# Patient Record
Sex: FEMALE | NOT HISPANIC OR LATINO
[De-identification: names, ages, dates, MRNs, and addresses within clinical notes are randomized per-mention and may not be internally consistent; named-entity substitution may affect disease eponyms.]

---

## 2020-09-12 ENCOUNTER — RX ONLY (OUTPATIENT)
Age: 50
Setting detail: RX ONLY
End: 2020-09-12

## 2020-09-12 ENCOUNTER — APPOINTMENT (RX ONLY)
Dept: URBAN - METROPOLITAN AREA CLINIC 368 | Facility: CLINIC | Age: 50
Setting detail: DERMATOLOGY
End: 2020-09-12

## 2020-09-12 DIAGNOSIS — L30.9 DERMATITIS, UNSPECIFIED: ICD-10-CM

## 2020-09-12 PROCEDURE — ? TREATMENT REGIMEN

## 2020-09-12 PROCEDURE — 99202 OFFICE O/P NEW SF 15 MIN: CPT

## 2020-09-12 PROCEDURE — ? COUNSELING

## 2020-09-12 RX ORDER — FLUOCINONIDE 0.5 MG/G
CREAM TOPICAL
Qty: 1 | Refills: 2 | Status: ERX | COMMUNITY
Start: 2020-09-12

## 2020-09-12 ASSESSMENT — LOCATION ZONE DERM: LOCATION ZONE: LEG

## 2020-09-12 ASSESSMENT — LOCATION SIMPLE DESCRIPTION DERM: LOCATION SIMPLE: RIGHT THIGH

## 2020-09-12 ASSESSMENT — LOCATION DETAILED DESCRIPTION DERM: LOCATION DETAILED: RIGHT ANTERIOR PROXIMAL THIGH

## 2020-09-28 ENCOUNTER — APPOINTMENT (RX ONLY)
Dept: URBAN - METROPOLITAN AREA CLINIC 368 | Facility: CLINIC | Age: 50
Setting detail: DERMATOLOGY
End: 2020-09-28

## 2020-09-28 DIAGNOSIS — L30.9 DERMATITIS, UNSPECIFIED: ICD-10-CM | Status: RESOLVED

## 2020-09-28 PROCEDURE — ? PRESCRIPTION

## 2020-09-28 PROCEDURE — ? TREATMENT REGIMEN

## 2020-09-28 PROCEDURE — ? COUNSELING

## 2020-09-28 PROCEDURE — 99213 OFFICE O/P EST LOW 20 MIN: CPT

## 2020-09-28 ASSESSMENT — LOCATION ZONE DERM: LOCATION ZONE: LEG

## 2020-09-28 ASSESSMENT — LOCATION DETAILED DESCRIPTION DERM: LOCATION DETAILED: RIGHT ANTERIOR PROXIMAL THIGH

## 2020-09-28 ASSESSMENT — LOCATION SIMPLE DESCRIPTION DERM: LOCATION SIMPLE: RIGHT THIGH

## 2020-09-28 ASSESSMENT — SEVERITY ASSESSMENT: SEVERITY: CLEAR

## 2020-09-28 NOTE — PROCEDURE: TREATMENT REGIMEN
Modify Regimen: taper off steroid: \\nApply Fluocinonide in the morning for 4 days.\\nApply Protopic in the evenings for 4 days.\\n\\nAfter 4 days switch to Protopic twice daily for 1 month, taper off medication as long as remains clear to 1-2 times weekly to prevent re-flare.
Plan: Patient has had this biopsied in 2018 and the results came back as Urticaria. \\nPatient reports bumps that seem to be spreading \\nPatient denies any itchiness. pain, burning, scaling, drainage. Rash is asymptomatic beside the texture of the skin in that area. Rash is unilateral, never a similar rash the left leg.\\n\\nDiscussed doing another biopsy, at the follow up if rash does not respond and continues to be bothersome. Discussed that two samples would need to be taken to get a good representation. \\n\\nConsider patch testing in the future, but does not seem to match up history and presentation with contact dermatitis however. She does have a good amount of seasonal and reported allergies.
Discontinue Regimen: Fluocinonide
Detail Level: Zone
Otc Regimen: Use gentle, fragrance free products such as Cetaphil

## 2020-09-29 RX ORDER — TACROLIMUS 1 MG/G
OINTMENT TOPICAL BID
Qty: 1 | Refills: 3 | Status: ERX | COMMUNITY
Start: 2020-09-29

## 2020-09-29 RX ADMIN — TACROLIMUS: 1 OINTMENT TOPICAL at 00:00

## 2020-11-03 ENCOUNTER — APPOINTMENT (RX ONLY)
Dept: URBAN - METROPOLITAN AREA CLINIC 368 | Facility: CLINIC | Age: 50
Setting detail: DERMATOLOGY
End: 2020-11-03

## 2020-11-03 DIAGNOSIS — L30.9 DERMATITIS, UNSPECIFIED: ICD-10-CM

## 2020-11-03 PROCEDURE — ? COUNSELING

## 2020-11-03 PROCEDURE — 99213 OFFICE O/P EST LOW 20 MIN: CPT

## 2020-11-03 PROCEDURE — ? TREATMENT REGIMEN

## 2020-11-03 ASSESSMENT — LOCATION ZONE DERM: LOCATION ZONE: LEG

## 2020-11-03 ASSESSMENT — LOCATION DETAILED DESCRIPTION DERM: LOCATION DETAILED: RIGHT ANTERIOR PROXIMAL THIGH

## 2020-11-03 ASSESSMENT — LOCATION SIMPLE DESCRIPTION DERM: LOCATION SIMPLE: RIGHT THIGH

## 2020-11-03 NOTE — PROCEDURE: TREATMENT REGIMEN
Plan: Patient has had this biopsied in 2018 and the results came back as Urticaria. \\nPatient reports bumps that seem to be spreading \\nPatient denies any itchiness. pain, burning, scaling, drainage. Rash is asymptomatic beside the texture of the skin in that area. Rash is unilateral, never a similar rash the left leg.\\n\\nDiscussed doing another biopsy, at the follow up if rash does not respond and continues to be bothersome. Discussed that two samples would need to be taken to get a good representation. \\n\\nConsider patch testing in the future, but does not seem to match up history and presentation with contact dermatitis however. She does have a good amount of seasonal and reported allergies.\\n\\nPatient to call and have her last biopsy results faxed to us. Will defer another biopsy until results are received.
Detail Level: Zone
Otc Regimen: Use gentle, fragrance free products such as Cetaphil
Modify Regimen: Taper off steroid: \\nApply Fluocinonide in the morning for 4 days.\\nApply Protopic in the evenings for 4 days.\\n\\nAfter 4 days switch to Protopic twice daily for 1 month, taper off medication as long as remains clear to 1-2 times weekly to prevent re-flare.\\n\\nApply Fluocinonide twice daily to affected areas of the elbow X 4 weeks.\\nPlan to continue on topicals, and will re-evaluate at next office visit. Plan to start on orals again if no improvement.
Discontinue Regimen: Fluocinonide

## 2024-07-10 ENCOUNTER — APPOINTMENT (RX ONLY)
Dept: URBAN - METROPOLITAN AREA CLINIC 41 | Facility: CLINIC | Age: 54
Setting detail: DERMATOLOGY
End: 2024-07-10

## 2024-07-10 DIAGNOSIS — Z41.9 ENCOUNTER FOR PROCEDURE FOR PURPOSES OTHER THAN REMEDYING HEALTH STATE, UNSPECIFIED: ICD-10-CM

## 2024-07-10 PROCEDURE — ? ADDITIONAL NOTES

## 2024-07-10 PROCEDURE — ? COSMETIC CONSULTATION: BOTULINUM TOXIN

## 2024-07-10 NOTE — PROCEDURE: ADDITIONAL NOTES
Additional Notes: -\\n\\nEm quotes 20 units for glabella for a total of $360\\nPt opts to think about it and will call back to schedule- can apply $100 consultation fee today, so would be $260
Detail Level: Detailed
Render Risk Assessment In Note?: no